# Patient Record
Sex: FEMALE | Race: WHITE | NOT HISPANIC OR LATINO | URBAN - METROPOLITAN AREA
[De-identification: names, ages, dates, MRNs, and addresses within clinical notes are randomized per-mention and may not be internally consistent; named-entity substitution may affect disease eponyms.]

---

## 2024-03-14 ENCOUNTER — OUTPATIENT (OUTPATIENT)
Dept: OUTPATIENT SERVICES | Facility: HOSPITAL | Age: 74
LOS: 1 days | End: 2024-03-14
Payer: MEDICARE

## 2024-03-14 VITALS
OXYGEN SATURATION: 96 % | SYSTOLIC BLOOD PRESSURE: 147 MMHG | TEMPERATURE: 99 F | WEIGHT: 190.92 LBS | DIASTOLIC BLOOD PRESSURE: 87 MMHG | HEIGHT: 65.75 IN | HEART RATE: 82 BPM | RESPIRATION RATE: 18 BRPM

## 2024-03-14 DIAGNOSIS — I10 ESSENTIAL (PRIMARY) HYPERTENSION: ICD-10-CM

## 2024-03-14 DIAGNOSIS — Z98.891 HISTORY OF UTERINE SCAR FROM PREVIOUS SURGERY: Chronic | ICD-10-CM

## 2024-03-14 DIAGNOSIS — Z01.818 ENCOUNTER FOR OTHER PREPROCEDURAL EXAMINATION: ICD-10-CM

## 2024-03-14 DIAGNOSIS — Z90.49 ACQUIRED ABSENCE OF OTHER SPECIFIED PARTS OF DIGESTIVE TRACT: Chronic | ICD-10-CM

## 2024-03-14 DIAGNOSIS — N95.0 POSTMENOPAUSAL BLEEDING: ICD-10-CM

## 2024-03-14 DIAGNOSIS — Z98.890 OTHER SPECIFIED POSTPROCEDURAL STATES: Chronic | ICD-10-CM

## 2024-03-14 LAB
ANION GAP SERPL CALC-SCNC: 12 MMOL/L — SIGNIFICANT CHANGE UP (ref 5–17)
BLD GP AB SCN SERPL QL: NEGATIVE — SIGNIFICANT CHANGE UP
BUN SERPL-MCNC: 13 MG/DL — SIGNIFICANT CHANGE UP (ref 7–23)
CALCIUM SERPL-MCNC: 9.4 MG/DL — SIGNIFICANT CHANGE UP (ref 8.4–10.5)
CHLORIDE SERPL-SCNC: 101 MMOL/L — SIGNIFICANT CHANGE UP (ref 96–108)
CO2 SERPL-SCNC: 26 MMOL/L — SIGNIFICANT CHANGE UP (ref 22–31)
CREAT SERPL-MCNC: 0.63 MG/DL — SIGNIFICANT CHANGE UP (ref 0.5–1.3)
EGFR: 95 ML/MIN/1.73M2 — SIGNIFICANT CHANGE UP
GLUCOSE SERPL-MCNC: 99 MG/DL — SIGNIFICANT CHANGE UP (ref 70–99)
HCT VFR BLD CALC: 43.9 % — SIGNIFICANT CHANGE UP (ref 34.5–45)
HGB BLD-MCNC: 14.1 G/DL — SIGNIFICANT CHANGE UP (ref 11.5–15.5)
MCHC RBC-ENTMCNC: 27.8 PG — SIGNIFICANT CHANGE UP (ref 27–34)
MCHC RBC-ENTMCNC: 32.1 GM/DL — SIGNIFICANT CHANGE UP (ref 32–36)
MCV RBC AUTO: 86.6 FL — SIGNIFICANT CHANGE UP (ref 80–100)
NRBC # BLD: 0 /100 WBCS — SIGNIFICANT CHANGE UP (ref 0–0)
PLATELET # BLD AUTO: 153 K/UL — SIGNIFICANT CHANGE UP (ref 150–400)
POTASSIUM SERPL-MCNC: 4.2 MMOL/L — SIGNIFICANT CHANGE UP (ref 3.5–5.3)
POTASSIUM SERPL-SCNC: 4.2 MMOL/L — SIGNIFICANT CHANGE UP (ref 3.5–5.3)
RBC # BLD: 5.07 M/UL — SIGNIFICANT CHANGE UP (ref 3.8–5.2)
RBC # FLD: 13.1 % — SIGNIFICANT CHANGE UP (ref 10.3–14.5)
RH IG SCN BLD-IMP: POSITIVE — SIGNIFICANT CHANGE UP
SODIUM SERPL-SCNC: 139 MMOL/L — SIGNIFICANT CHANGE UP (ref 135–145)
WBC # BLD: 8.57 K/UL — SIGNIFICANT CHANGE UP (ref 3.8–10.5)
WBC # FLD AUTO: 8.57 K/UL — SIGNIFICANT CHANGE UP (ref 3.8–10.5)

## 2024-03-14 PROCEDURE — G0463: CPT

## 2024-03-14 PROCEDURE — 36415 COLL VENOUS BLD VENIPUNCTURE: CPT

## 2024-03-14 PROCEDURE — 86900 BLOOD TYPING SEROLOGIC ABO: CPT

## 2024-03-14 PROCEDURE — 80048 BASIC METABOLIC PNL TOTAL CA: CPT

## 2024-03-14 PROCEDURE — 85027 COMPLETE CBC AUTOMATED: CPT

## 2024-03-14 PROCEDURE — 86901 BLOOD TYPING SEROLOGIC RH(D): CPT

## 2024-03-14 PROCEDURE — 86850 RBC ANTIBODY SCREEN: CPT

## 2024-03-14 RX ORDER — SODIUM CHLORIDE 9 MG/ML
1000 INJECTION, SOLUTION INTRAVENOUS
Refills: 0 | Status: DISCONTINUED | OUTPATIENT
Start: 2024-03-22 | End: 2024-04-05

## 2024-03-14 RX ORDER — LIDOCAINE HCL 20 MG/ML
0.2 VIAL (ML) INJECTION ONCE
Refills: 0 | Status: DISCONTINUED | OUTPATIENT
Start: 2024-03-22 | End: 2024-04-05

## 2024-03-14 NOTE — H&P PST ADULT - HISTORY OF PRESENT ILLNESS
73 year old female presents to PST prior to D&C hysteroscopy on 3/22/24 with Dr. Mandel. Patient endorses vaginal bleeding    PMHx   LMP  Patient denies chest pain, sob, ha, n/v/d, abdominal pain, f/c, urinary symptoms, hematuria  73 year old female presents to [facility] prior to D&C hysteroscopy on 3/22/24 with Dr. Mandel. Patient endorses vaginal spotting (scant blood/mucus) which started within the last year, intermittent spotting that has increased in frequency.   PMHx: HTN, heart murmur over 10 years ago, echo performed negative per patient with no need to follow up per patient. History of DVT in the right knee (22 years old) coumadin x1 month.   LMP: 25 years ago   Patient denies chest pain, sob, ha, n/v/d, abdominal pain, f/c.  73 year old female presents to [facility] prior to D&C hysteroscopy on 3/22/24 with Dr. Mandel. Patient endorses vaginal spotting (scant blood/mucus) which started within the last year, intermittent spotting that has increased in frequency.   PMHx: HTN, heart murmur over 10 years ago, echo performed negative per patient with no need to follow up per patient. History of DVT in the right knee (22 years old) coumadin x1 month (no other history of blood clots, no need to follow up per patient)  LMP: 25 years ago   Patient denies chest pain, sob, ha, n/v/d, abdominal pain, f/c.

## 2024-03-14 NOTE — H&P PST ADULT - NSICDXFAMILYHX_GEN_ALL_CORE_FT
FAMILY HISTORY:  Father  Still living? Unknown  FH: pancreatic cancer, Age at diagnosis: Age Unknown    Mother  Still living? Unknown  FH: breast cancer, Age at diagnosis: Age Unknown  FH: colon cancer, Age at diagnosis: Age Unknown

## 2024-03-14 NOTE — H&P PST ADULT - ASSESSMENT
denies loose and broken teeth, no dentures present  denies loose and broken teeth, no dentures present     · Functional Status	4-10 METS  6.7 daily walking 10,000 steps, caring for grandchild

## 2024-03-14 NOTE — H&P PST ADULT - NSICDXPASTMEDICALHX_GEN_ALL_CORE_FT
PAST MEDICAL HISTORY:  DVT, lower extremity     HTN (hypertension)      PAST MEDICAL HISTORY:  DVT, lower extremity     History of cardiac murmur     HTN (hypertension)

## 2024-03-14 NOTE — H&P PST ADULT - PROBLEM SELECTOR PLAN 1
D&C Hysteroscopy   CBC BMP and T&S in PST   Instructions and diet reviewed with patient and provided written copy.

## 2024-03-14 NOTE — H&P PST ADULT - ATTENDING COMMENTS
78yo F w pmb for hysteroscopy, unable to do in office, plan to perform under sedation today  consents in chart

## 2024-03-21 ENCOUNTER — TRANSCRIPTION ENCOUNTER (OUTPATIENT)
Age: 74
End: 2024-03-21

## 2024-03-22 ENCOUNTER — OUTPATIENT (OUTPATIENT)
Dept: OUTPATIENT SERVICES | Facility: HOSPITAL | Age: 74
LOS: 1 days | End: 2024-03-22
Payer: MEDICARE

## 2024-03-22 ENCOUNTER — TRANSCRIPTION ENCOUNTER (OUTPATIENT)
Age: 74
End: 2024-03-22

## 2024-03-22 VITALS
HEART RATE: 66 BPM | WEIGHT: 190.92 LBS | OXYGEN SATURATION: 97 % | HEIGHT: 65 IN | TEMPERATURE: 98 F | SYSTOLIC BLOOD PRESSURE: 136 MMHG | RESPIRATION RATE: 16 BRPM | DIASTOLIC BLOOD PRESSURE: 77 MMHG

## 2024-03-22 VITALS
RESPIRATION RATE: 20 BRPM | SYSTOLIC BLOOD PRESSURE: 132 MMHG | OXYGEN SATURATION: 98 % | DIASTOLIC BLOOD PRESSURE: 74 MMHG | HEART RATE: 58 BPM | TEMPERATURE: 97 F

## 2024-03-22 DIAGNOSIS — Z98.890 OTHER SPECIFIED POSTPROCEDURAL STATES: Chronic | ICD-10-CM

## 2024-03-22 DIAGNOSIS — Z98.891 HISTORY OF UTERINE SCAR FROM PREVIOUS SURGERY: Chronic | ICD-10-CM

## 2024-03-22 DIAGNOSIS — Z90.49 ACQUIRED ABSENCE OF OTHER SPECIFIED PARTS OF DIGESTIVE TRACT: Chronic | ICD-10-CM

## 2024-03-22 DIAGNOSIS — N95.0 POSTMENOPAUSAL BLEEDING: ICD-10-CM

## 2024-03-22 PROCEDURE — 58558 HYSTEROSCOPY BIOPSY: CPT

## 2024-03-22 PROCEDURE — 88341 IMHCHEM/IMCYTCHM EA ADD ANTB: CPT | Mod: 26

## 2024-03-22 PROCEDURE — C1782: CPT

## 2024-03-22 PROCEDURE — 88342 IMHCHEM/IMCYTCHM 1ST ANTB: CPT | Mod: 26

## 2024-03-22 PROCEDURE — 88342 IMHCHEM/IMCYTCHM 1ST ANTB: CPT | Mod: XU

## 2024-03-22 PROCEDURE — 88305 TISSUE EXAM BY PATHOLOGIST: CPT

## 2024-03-22 PROCEDURE — 88305 TISSUE EXAM BY PATHOLOGIST: CPT | Mod: 26

## 2024-03-22 PROCEDURE — 88360 TUMOR IMMUNOHISTOCHEM/MANUAL: CPT

## 2024-03-22 PROCEDURE — 88341 IMHCHEM/IMCYTCHM EA ADD ANTB: CPT

## 2024-03-22 DEVICE — AVETA FLEX RESECTING DEVICE 2.9MM: Type: IMPLANTABLE DEVICE | Status: FUNCTIONAL

## 2024-03-22 RX ORDER — HYDROMORPHONE HYDROCHLORIDE 2 MG/ML
0.5 INJECTION INTRAMUSCULAR; INTRAVENOUS; SUBCUTANEOUS
Refills: 0 | Status: DISCONTINUED | OUTPATIENT
Start: 2024-03-22 | End: 2024-03-22

## 2024-03-22 RX ORDER — ONDANSETRON 8 MG/1
4 TABLET, FILM COATED ORAL ONCE
Refills: 0 | Status: DISCONTINUED | OUTPATIENT
Start: 2024-03-22 | End: 2024-04-05

## 2024-03-22 NOTE — ASU DISCHARGE PLAN (ADULT/PEDIATRIC) - CARE PROVIDER_API CALL
Lionel Mandel  Obstetrics and Gynecology  [address] [facility], [address]  [place] [address]  Phone: [phone]  Fax: [phone]  Follow Up Time:

## 2024-03-22 NOTE — ASU PATIENT PROFILE, ADULT - NSICDXPASTMEDICALHX_GEN_ALL_CORE_FT
PAST MEDICAL HISTORY:  DVT, lower extremity     History of cardiac murmur     HTN (hypertension)

## 2024-03-22 NOTE — ASU DISCHARGE PLAN (ADULT/PEDIATRIC) - NURSING INSTRUCTIONS
If needed you can take Tylenol 650mg-1000mg again at 6pm for pain. Please do not exceed more then 4000mg in 24 hours. You can also alternate with Ibuprofen 600mg and take if every 6 hours, 3 hours apart from Tylenol with food. The next time you can take Ibuprofen is 6pm.

## 2024-03-22 NOTE — PRE-ANESTHESIA EVALUATION ADULT - NSANTHPMHFT_GEN_ALL_CORE
73F with PMH s/f HTN, hx provoked RLE DVT (in s/o OCP, no longer on AC) who has been experiencing vaginal spotting and now presents for hysteroscopy D&C    #heart murmur over 10 years ago, echo performed negative per patient. denies syncope, cp, sob with exertion

## 2024-03-22 NOTE — ASU DISCHARGE PLAN (ADULT/PEDIATRIC) - NS MD DC FALL RISK RISK
For information on Fall & Injury Prevention, visit: [email or web address] OR  [email or web address] OR  https://www.cdc.gov/steadi/patient.html

## 2024-03-22 NOTE — ASU PREOP CHECKLIST - NS PREOP CHK HIBICLENS NA
SKILLED SERVICES PROVIDED / MEDICAL   ASSESSMENT OF SOCIAL AND EMOTIONAL FACTORS  COUNSELING FOR LONG RANGE PLANNING AND DECISION MAKING  COMMUNITY RESOURCE PLANNING    HOMEBOUND STATUS- taxing effort to leave home, cont 02    HOME/SOCIAL ENVIRONMENT-Patient lives alone in a large home that she is having a hard time maintaining. Her niece was present and she assist patient with needs    CASE COMMUNICATION with Mary Flores. KARY N/A

## 2024-03-27 LAB — SURGICAL PATHOLOGY STUDY: SIGNIFICANT CHANGE UP

## 2024-04-05 PROBLEM — I82.409 ACUTE EMBOLISM AND THROMBOSIS OF UNSPECIFIED DEEP VEINS OF UNSPECIFIED LOWER EXTREMITY: Chronic | Status: ACTIVE | Noted: 2024-03-14

## 2024-04-08 ENCOUNTER — NON-APPOINTMENT (OUTPATIENT)
Age: 74
End: 2024-04-08

## 2024-04-10 ENCOUNTER — APPOINTMENT (OUTPATIENT)
Dept: CT IMAGING | Facility: IMAGING CENTER | Age: 74
End: 2024-04-10
Payer: MEDICARE

## 2024-04-10 ENCOUNTER — OUTPATIENT (OUTPATIENT)
Dept: OUTPATIENT SERVICES | Facility: HOSPITAL | Age: 74
LOS: 1 days | End: 2024-04-10
Payer: MEDICARE

## 2024-04-10 DIAGNOSIS — Z98.891 HISTORY OF UTERINE SCAR FROM PREVIOUS SURGERY: Chronic | ICD-10-CM

## 2024-04-10 DIAGNOSIS — C55 MALIGNANT NEOPLASM OF UTERUS, PART UNSPECIFIED: ICD-10-CM

## 2024-04-10 DIAGNOSIS — Z98.890 OTHER SPECIFIED POSTPROCEDURAL STATES: Chronic | ICD-10-CM

## 2024-04-10 DIAGNOSIS — Z90.49 ACQUIRED ABSENCE OF OTHER SPECIFIED PARTS OF DIGESTIVE TRACT: Chronic | ICD-10-CM

## 2024-04-10 PROCEDURE — 74177 CT ABD & PELVIS W/CONTRAST: CPT | Mod: 26,MH

## 2024-04-10 PROCEDURE — 74177 CT ABD & PELVIS W/CONTRAST: CPT | Mod: MH

## 2024-04-11 PROBLEM — I10 ESSENTIAL (PRIMARY) HYPERTENSION: Chronic | Status: ACTIVE | Noted: 2024-03-14

## 2024-04-11 PROBLEM — Z86.79 PERSONAL HISTORY OF OTHER DISEASES OF THE CIRCULATORY SYSTEM: Chronic | Status: ACTIVE | Noted: 2024-03-14

## 2024-04-12 ENCOUNTER — APPOINTMENT (OUTPATIENT)
Dept: GYNECOLOGIC ONCOLOGY | Facility: CLINIC | Age: 74
End: 2024-04-12
Payer: MEDICARE

## 2024-04-12 VITALS
BODY MASS INDEX: 31.65 KG/M2 | DIASTOLIC BLOOD PRESSURE: 80 MMHG | WEIGHT: 190 LBS | SYSTOLIC BLOOD PRESSURE: 140 MMHG | HEIGHT: 65 IN | HEART RATE: 67 BPM

## 2024-04-12 DIAGNOSIS — Z78.9 OTHER SPECIFIED HEALTH STATUS: ICD-10-CM

## 2024-04-12 PROCEDURE — 99205 OFFICE O/P NEW HI 60 MIN: CPT

## 2024-04-12 PROCEDURE — 99459 PELVIC EXAMINATION: CPT

## 2024-04-12 RX ORDER — LISINOPRIL 30 MG/1
TABLET ORAL
Refills: 0 | Status: ACTIVE | COMMUNITY

## 2024-04-12 RX ORDER — HYDROCHLOROTHIAZIDE 12.5 MG/1
TABLET ORAL
Refills: 0 | Status: ACTIVE | COMMUNITY

## 2024-04-12 NOTE — PHYSICAL EXAM
[Chaperone Present] : A chaperone was present in the examining room during all aspects of the physical examination [55922] : A chaperone was present during the pelvic exam. [FreeTextEntry2] : Duyen [Normal] : Recto-Vaginal Exam: Normal [de-identified] : vertical abdominal incision  [de-identified] : small uterus, vaginal length shortened, stenotic upper vaginal canal [Fully active, able to carry on all pre-disease performance without restriction] : Status 0 - Fully active, able to carry on all pre-disease performance without restriction

## 2024-04-12 NOTE — DISCUSSION/SUMMARY
[Reviewed Clinical Lab Test(s)] : Results of clinical tests were reviewed. [Reviewed Radiology Report(s)] : Radiology reports were reviewed. [Reviewed Radiology Film/Image(s)] : Images from radiology studies were reviewed and examined. [Discuss Tests w/Referring Providers] : Results of labs/radiology studies and the treatment recommendations were discussed with performing/referring physician. [Discuss Alternatives/Risks/Benefits w/Patient] : All alternatives, risks, and benefits were discussed with the patient/family and all questions were answered.  Patient expressed good understanding and appreciates the importance of follow up as recommended. [Visit Time ___ Minutes] : [unfilled] minutes [FreeTextEntry1] : 74 yo with G2 endometrial adenocarcinoma   The natural history of, risks, prognosis and standard of care treatment options for endometrial cancer were reviewed at length. The association of endometrial cancer with unopposed estrogen exposure, obesity or a family history of endometrial cancer were discussed. The following treatment options were reviewed with the patient:    Hysterectomy (open approach)  Hysterectomy (minimally invasive approach)  Primary radiation therapy  No further treatment   We discussed that a total hysterectomy, bilateral salpingo-oophorectomy and lymph node assessment is the standard of care for patients that are of appropriate surgical risk. Risk, benefits and alternatives to hysterectomy were reviewed including bleeding, infection, damage to nerves, blood vessels, bowel, bladder, ureters, urinary incontinence and lymphedema. We discussed the limited role of primary radiation therapy for patients that are of adequate surgical risk; however, the need for possible adjuvant therapies after surgery (including radiation) was discussed briefly.   We have discussed the risk of bleeding and the possibility of blood transfusions, which the patient is amenable to in the event that it is needed.   We have discussed the possibility of infection, including superficial infection of the wound, the underlying tissues or deep infection in the abdomen or pelvis, as well as urinary tract infection and pneumonia.  We discussed the steps taken to mitigate that risk; but the possibility of need for antibiotics, a prolonged hospital stay, hospital readmission, or ICU stay remain.  We discussed the individual risk of infection is different for each patient and each procedure.   We have discussed risk of damage to surrounding structures, including the large and small bowel, bladder and ureters, blood vessels, and nerves. We discussed that we attempt to identify and fix any injury at the time of surgery, but that this may involve a more extensive operation that originally planned, the possible involvement of additional surgical subspecialists and a longer hospital stay than is typical.   There is an increased risk of VTE and PE related to surgery and hospitalization especially in the setting of a cancer diagnosis. We discussed administration of heparin pre-operatively as well as during hospitalization if necessary to prevent formation of blood clots.   We discussed that there may be medical students, residents and fellows present during her procedure and participating in her post-operative care under my direct supervision. We discussed the importance of individuals participating in the procedure, including myself and assistants, to perform exam under anesthesia.   We have discussed the post operative expectations for the surgery as planned, but deviations from the surgical plan may be required in order to achieve the surgical goals. This may necessitate additional post operative care, in-hospital or at home as determined by the situation.    Based on exam and imaging findings, a minimally invasive surgical approach is recommended. The benefits of minimally invasive surgery were extensively reviewed, including fewer perioperative complications, shorter hospital stays and faster recovery time when compared to surgery via a laparotomy incision. Minimally invasive surgery in this setting is generally performed with a robotic approach. Randomized controlled trials demonstrate that endometrial cancer outcomes and survival are similar when women are treated with a minimally invasive or an open abdominal approach. The patient is aware that conversion to an open abdominal procedure may be required.   We discussed the utility of sentinel node mapping with ICG in endometrial cancer and that it is effective with a low false negative rate in all histological subtypes. The patient DOES NOT have an iodine allergy or known liver dysfunction. A low risk of allergic reaction to the ICG has been reported. We also discussed that in the case of failed mapping, a bilateral or unilateral lymphadenectomy will be performed at the surgeon's discretion depending on results of frozen pathology that may be utilized in the event of failed mapping.   Potential benefits of sentinel nodes including a higher detection rate for metastasis due to ultra-staging and potential reduction in operative morbidity. However, there remains uncertainty as to the role for treatment of micro-metastatic disease. Further, the benefit of operative morbidity associated with the SLN technique in endometrial cancer is not completely known. In other patient populations (e.g. the cervical cancer population) there has been observed reductions in morbidity with SLN biopsy compared to pelvic lymphadenectomy. Lymphedema, nerve dysfunction and lymphocysts are all potential risks with the SLN technique, however this risk is likely mitigated as compared to complete lymphadenectomy.   I gave the patient the opportunity to ask questions and answered all of them to her satisfaction. Support was also offered, and we will schedule the patient for a preoperative assessment and surgery. The patient is aware that final treatment and surveillance recommendations will be made after her case is reviewed by pathology and at our multidisciplinary tumor board conference following surgery.  Med clearance, elevated BP in office today PST OR scheduling   Ariella Reza MD, FACOG  Gynecologic Oncology

## 2024-04-12 NOTE — HISTORY OF PRESENT ILLNESS
[FreeTextEntry1] : Referred by Dr Mandel PCP Jignesh Pagan [phone]  74 yo with PMB found to have FIGO G2 EMCA on D+C.  States she has been having bleeding for 6-7 months. Mostly spotting when she wipes. Has been intermittent. Saw gyn who recommended sampling. She underwent D+C in OR.  States she went through menopause in her 50s. No bleeding until this event.    The patient denies weight change, headache, fatigue, CP, SOB, abdominal pain, distension, constipation, diarrhea, urinary symptoms.  She reports history of VTE @ 23 provoked by OCPs. Not on AC now.   HCM: Pap: 12/2023 NILM HPV - Mammo: unknown C-scope: 2023 wnl  PMH: HTN PSH: Appendectomy 1970s, CS 1985  Gyn Hx:  51yo  Ob Hx:  CSx1 Meds: Lisinopril 10, HCTZ 25, MVI Allergies: PCN- rash in childhood FH:Mother colon and breast 70s-80s, grandfather stomach 60s SH: Former smoker, quit 30 years ago, ETOH 6 drinks/wk , lives with , works as   TAUS: Ut 7.1 x 4 x 4 cm  CTAP performed 4/10 read pending

## 2024-04-18 ENCOUNTER — OUTPATIENT (OUTPATIENT)
Dept: OUTPATIENT SERVICES | Facility: HOSPITAL | Age: 74
LOS: 1 days | End: 2024-04-18

## 2024-04-18 VITALS
TEMPERATURE: 98 F | OXYGEN SATURATION: 97 % | DIASTOLIC BLOOD PRESSURE: 83 MMHG | WEIGHT: 199.08 LBS | HEART RATE: 65 BPM | SYSTOLIC BLOOD PRESSURE: 127 MMHG | HEIGHT: 64 IN | RESPIRATION RATE: 14 BRPM

## 2024-04-18 DIAGNOSIS — Z98.890 OTHER SPECIFIED POSTPROCEDURAL STATES: Chronic | ICD-10-CM

## 2024-04-18 DIAGNOSIS — Z98.891 HISTORY OF UTERINE SCAR FROM PREVIOUS SURGERY: Chronic | ICD-10-CM

## 2024-04-18 DIAGNOSIS — Z91.89 OTHER SPECIFIED PERSONAL RISK FACTORS, NOT ELSEWHERE CLASSIFIED: ICD-10-CM

## 2024-04-18 DIAGNOSIS — I10 ESSENTIAL (PRIMARY) HYPERTENSION: ICD-10-CM

## 2024-04-18 DIAGNOSIS — C54.1 MALIGNANT NEOPLASM OF ENDOMETRIUM: ICD-10-CM

## 2024-04-18 LAB
A1C WITH ESTIMATED AVERAGE GLUCOSE RESULT: 5.9 % — HIGH (ref 4–5.6)
ALBUMIN SERPL ELPH-MCNC: 4.3 G/DL — SIGNIFICANT CHANGE UP (ref 3.3–5)
ALP SERPL-CCNC: 96 U/L — SIGNIFICANT CHANGE UP (ref 40–120)
ALT FLD-CCNC: 23 U/L — SIGNIFICANT CHANGE UP (ref 4–33)
APPEARANCE UR: CLEAR — SIGNIFICANT CHANGE UP
AST SERPL-CCNC: 19 U/L — SIGNIFICANT CHANGE UP (ref 4–32)
BACTERIA # UR AUTO: NEGATIVE /HPF — SIGNIFICANT CHANGE UP
BILIRUB DIRECT SERPL-MCNC: <0.2 MG/DL — SIGNIFICANT CHANGE UP (ref 0–0.3)
BILIRUB INDIRECT FLD-MCNC: >0 MG/DL — SIGNIFICANT CHANGE UP (ref 0–1)
BILIRUB SERPL-MCNC: 0.2 MG/DL — SIGNIFICANT CHANGE UP (ref 0.2–1.2)
BILIRUB UR-MCNC: NEGATIVE — SIGNIFICANT CHANGE UP
BLD GP AB SCN SERPL QL: NEGATIVE — SIGNIFICANT CHANGE UP
CAST: 0 /LPF — SIGNIFICANT CHANGE UP (ref 0–4)
COLOR SPEC: YELLOW — SIGNIFICANT CHANGE UP
DIFF PNL FLD: ABNORMAL
ESTIMATED AVERAGE GLUCOSE: 123 — SIGNIFICANT CHANGE UP
GLUCOSE UR QL: NEGATIVE MG/DL — SIGNIFICANT CHANGE UP
HCT VFR BLD CALC: 44 % — SIGNIFICANT CHANGE UP (ref 34.5–45)
HGB BLD-MCNC: 14.1 G/DL — SIGNIFICANT CHANGE UP (ref 11.5–15.5)
KETONES UR-MCNC: NEGATIVE MG/DL — SIGNIFICANT CHANGE UP
LEUKOCYTE ESTERASE UR-ACNC: ABNORMAL
MCHC RBC-ENTMCNC: 27.9 PG — SIGNIFICANT CHANGE UP (ref 27–34)
MCHC RBC-ENTMCNC: 32 GM/DL — SIGNIFICANT CHANGE UP (ref 32–36)
MCV RBC AUTO: 87.1 FL — SIGNIFICANT CHANGE UP (ref 80–100)
NITRITE UR-MCNC: NEGATIVE — SIGNIFICANT CHANGE UP
NRBC # BLD: 0 /100 WBCS — SIGNIFICANT CHANGE UP (ref 0–0)
NRBC # FLD: 0 K/UL — SIGNIFICANT CHANGE UP (ref 0–0)
PH UR: 6.5 — SIGNIFICANT CHANGE UP (ref 5–8)
PLATELET # BLD AUTO: 149 K/UL — LOW (ref 150–400)
PROT SERPL-MCNC: 7.2 G/DL — SIGNIFICANT CHANGE UP (ref 6–8.3)
PROT UR-MCNC: NEGATIVE MG/DL — SIGNIFICANT CHANGE UP
RBC # BLD: 5.05 M/UL — SIGNIFICANT CHANGE UP (ref 3.8–5.2)
RBC # FLD: 13.2 % — SIGNIFICANT CHANGE UP (ref 10.3–14.5)
RBC CASTS # UR COMP ASSIST: 13 /HPF — HIGH (ref 0–4)
RH IG SCN BLD-IMP: POSITIVE — SIGNIFICANT CHANGE UP
RH IG SCN BLD-IMP: POSITIVE — SIGNIFICANT CHANGE UP
SP GR SPEC: 1.01 — SIGNIFICANT CHANGE UP (ref 1–1.03)
SQUAMOUS # UR AUTO: 2 /HPF — SIGNIFICANT CHANGE UP (ref 0–5)
UROBILINOGEN FLD QL: 0.2 MG/DL — SIGNIFICANT CHANGE UP (ref 0.2–1)
WBC # BLD: 6.41 K/UL — SIGNIFICANT CHANGE UP (ref 3.8–10.5)
WBC # FLD AUTO: 6.41 K/UL — SIGNIFICANT CHANGE UP (ref 3.8–10.5)
WBC UR QL: 19 /HPF — HIGH (ref 0–5)

## 2024-04-18 RX ORDER — SODIUM CHLORIDE 9 MG/ML
1000 INJECTION, SOLUTION INTRAVENOUS
Refills: 0 | Status: DISCONTINUED | OUTPATIENT
Start: 2024-04-25 | End: 2024-05-09

## 2024-04-18 NOTE — H&P PST ADULT - HISTORY OF PRESENT ILLNESS
73 year old female presents to [facility], with pre op diagnosis of endometrial adenocarcinoma, for pre op evaluation prior to scheduled surgery- robotic assisted total laparoscopic hysterectomy, bilateral salpingo oophorectomy sentinel lymph node mapping and excoision cystoscopy with Dr Reza. Patient endorses vaginal  intermittent spotting that has increased in frequency since couple of months.

## 2024-04-18 NOTE — H&P PST ADULT - PROBLEM SELECTOR PLAN 1
Patient is tentatively scheduled scheduled surgery- robotic assisted total laparoscopic hysterectomy, bilateral salpingo oophorectomy sentinel lymph node mapping and excision cystoscopy with Dr Reza. - 04/25/24.    Pre-op instructions provided. Pt given verbal and written instructions with teach back on chlorhexidine wash and pepcid. Pt verbalized understanding with return demonstration.    CBC- due to intermittent vaginal spotting, Hepatic panel-, A1C,T&S, ABO, UA, Urine culture sent

## 2024-04-18 NOTE — H&P PST ADULT - GENITOURINARY COMMENTS
deferred/ pre op diagnosis- malignant neoplasm of endometrium patient reports she has intermittent vaginal spotting with increased frequency since couple of months, s/p hysteroscopy biopsy showed endometrial adenocarcinoma

## 2024-04-18 NOTE — H&P PST ADULT - ATTENDING COMMENTS
Risks, benefits, alternatives discussed at length in office  Consents signed  Proceed as planned    Ariella Reza MD, FACOG  Gynecologic Oncology

## 2024-04-18 NOTE — H&P PST ADULT - NSICDXPASTSURGICALHX_GEN_ALL_CORE_FT
PAST SURGICAL HISTORY:  H/O  section     H/O colonoscopy     History of hysteroscopy     S/P appendectomy

## 2024-04-18 NOTE — H&P PST ADULT - PROBLEM SELECTOR PLAN 2
Patient instructed to take lisinopril with a sip of water on the morning of procedure. Instruct patient to hold HCTZ on the DOS  Will obtain last EKG from PCP

## 2024-04-18 NOTE — H&P PST ADULT - NSICDXPASTMEDICALHX_GEN_ALL_CORE_FT
PAST MEDICAL HISTORY:  Anxiety     DVT, lower extremity     History of bronchitis     History of cardiac murmur     HTN (hypertension)

## 2024-04-19 ENCOUNTER — NON-APPOINTMENT (OUTPATIENT)
Age: 74
End: 2024-04-19

## 2024-04-19 LAB
CULTURE RESULTS: SIGNIFICANT CHANGE UP
SPECIMEN SOURCE: SIGNIFICANT CHANGE UP

## 2024-04-23 ENCOUNTER — NON-APPOINTMENT (OUTPATIENT)
Age: 74
End: 2024-04-23

## 2024-04-24 ENCOUNTER — TRANSCRIPTION ENCOUNTER (OUTPATIENT)
Age: 74
End: 2024-04-24

## 2024-04-25 ENCOUNTER — RESULT REVIEW (OUTPATIENT)
Age: 74
End: 2024-04-25

## 2024-04-25 ENCOUNTER — NON-APPOINTMENT (OUTPATIENT)
Age: 74
End: 2024-04-25

## 2024-04-25 ENCOUNTER — APPOINTMENT (OUTPATIENT)
Dept: GYNECOLOGIC ONCOLOGY | Facility: HOSPITAL | Age: 74
End: 2024-04-25

## 2024-04-25 ENCOUNTER — OUTPATIENT (OUTPATIENT)
Dept: OUTPATIENT SERVICES | Facility: HOSPITAL | Age: 74
LOS: 1 days | Discharge: HOME | End: 2024-04-25
Payer: MEDICARE

## 2024-04-25 ENCOUNTER — TRANSCRIPTION ENCOUNTER (OUTPATIENT)
Age: 74
End: 2024-04-25

## 2024-04-25 VITALS
RESPIRATION RATE: 16 BRPM | OXYGEN SATURATION: 96 % | WEIGHT: 199.08 LBS | DIASTOLIC BLOOD PRESSURE: 75 MMHG | SYSTOLIC BLOOD PRESSURE: 147 MMHG | HEIGHT: 64 IN | HEART RATE: 75 BPM | TEMPERATURE: 98 F

## 2024-04-25 VITALS
SYSTOLIC BLOOD PRESSURE: 143 MMHG | RESPIRATION RATE: 16 BRPM | DIASTOLIC BLOOD PRESSURE: 76 MMHG | HEART RATE: 77 BPM | OXYGEN SATURATION: 98 %

## 2024-04-25 DIAGNOSIS — Z98.891 HISTORY OF UTERINE SCAR FROM PREVIOUS SURGERY: Chronic | ICD-10-CM

## 2024-04-25 DIAGNOSIS — Z98.890 OTHER SPECIFIED POSTPROCEDURAL STATES: Chronic | ICD-10-CM

## 2024-04-25 DIAGNOSIS — Z90.49 ACQUIRED ABSENCE OF OTHER SPECIFIED PARTS OF DIGESTIVE TRACT: Chronic | ICD-10-CM

## 2024-04-25 DIAGNOSIS — C54.1 MALIGNANT NEOPLASM OF ENDOMETRIUM: ICD-10-CM

## 2024-04-25 LAB — GLUCOSE BLDC GLUCOMTR-MCNC: 100 MG/DL — HIGH (ref 70–99)

## 2024-04-25 PROCEDURE — 38571 LAPAROSCOPY LYMPHADENECTOMY: CPT | Mod: 52

## 2024-04-25 PROCEDURE — 88342 IMHCHEM/IMCYTCHM 1ST ANTB: CPT | Mod: 26

## 2024-04-25 PROCEDURE — 38571 LAPAROSCOPY LYMPHADENECTOMY: CPT | Mod: AS

## 2024-04-25 PROCEDURE — 38900 IO MAP OF SENT LYMPH NODE: CPT | Mod: 50

## 2024-04-25 PROCEDURE — 88112 CYTOPATH CELL ENHANCE TECH: CPT | Mod: 26

## 2024-04-25 PROCEDURE — 58571 TLH W/T/O 250 G OR LESS: CPT

## 2024-04-25 PROCEDURE — 88309 TISSUE EXAM BY PATHOLOGIST: CPT | Mod: 26

## 2024-04-25 PROCEDURE — 88307 TISSUE EXAM BY PATHOLOGIST: CPT | Mod: 26

## 2024-04-25 PROCEDURE — 58571 TLH W/T/O 250 G OR LESS: CPT | Mod: AS

## 2024-04-25 DEVICE — VISTASEAL FIBRIN HUMAN 10ML: Type: IMPLANTABLE DEVICE | Status: FUNCTIONAL

## 2024-04-25 RX ORDER — LISINOPRIL 2.5 MG/1
1 TABLET ORAL
Refills: 0 | DISCHARGE

## 2024-04-25 RX ORDER — OXYCODONE HYDROCHLORIDE 5 MG/1
1 TABLET ORAL
Qty: 10 | Refills: 0
Start: 2024-04-25

## 2024-04-25 RX ORDER — HYDROMORPHONE HYDROCHLORIDE 2 MG/ML
0.25 INJECTION INTRAMUSCULAR; INTRAVENOUS; SUBCUTANEOUS
Refills: 0 | Status: DISCONTINUED | OUTPATIENT
Start: 2024-04-25 | End: 2024-04-25

## 2024-04-25 RX ORDER — CHLORHEXIDINE GLUCONATE 213 G/1000ML
1 SOLUTION TOPICAL ONCE
Refills: 0 | Status: COMPLETED | OUTPATIENT
Start: 2024-04-25 | End: 2024-04-25

## 2024-04-25 RX ORDER — ACETAMINOPHEN 500 MG
3 TABLET ORAL
Qty: 0 | Refills: 0 | DISCHARGE

## 2024-04-25 RX ORDER — OXYCODONE HYDROCHLORIDE 5 MG/1
5 TABLET ORAL ONCE
Refills: 0 | Status: DISCONTINUED | OUTPATIENT
Start: 2024-04-25 | End: 2024-04-25

## 2024-04-25 RX ORDER — HYDROCHLOROTHIAZIDE 25 MG
1 TABLET ORAL
Refills: 0 | DISCHARGE

## 2024-04-25 RX ORDER — FENTANYL CITRATE 50 UG/ML
25 INJECTION INTRAVENOUS
Refills: 0 | Status: DISCONTINUED | OUTPATIENT
Start: 2024-04-25 | End: 2024-04-25

## 2024-04-25 RX ORDER — ONDANSETRON 8 MG/1
4 TABLET, FILM COATED ORAL ONCE
Refills: 0 | Status: DISCONTINUED | OUTPATIENT
Start: 2024-04-25 | End: 2024-05-09

## 2024-04-25 RX ORDER — BENZOCAINE AND MENTHOL 5; 1 G/100ML; G/100ML
1 LIQUID ORAL ONCE
Refills: 0 | Status: DISCONTINUED | OUTPATIENT
Start: 2024-04-25 | End: 2024-05-09

## 2024-04-25 RX ORDER — SODIUM CHLORIDE 9 MG/ML
1000 INJECTION, SOLUTION INTRAVENOUS
Refills: 0 | Status: DISCONTINUED | OUTPATIENT
Start: 2024-04-25 | End: 2024-05-09

## 2024-04-25 RX ORDER — HYDROMORPHONE HYDROCHLORIDE 2 MG/ML
0.5 INJECTION INTRAMUSCULAR; INTRAVENOUS; SUBCUTANEOUS
Refills: 0 | Status: DISCONTINUED | OUTPATIENT
Start: 2024-04-25 | End: 2024-04-25

## 2024-04-25 RX ORDER — IBUPROFEN 200 MG
3 TABLET ORAL
Qty: 0 | Refills: 0 | DISCHARGE

## 2024-04-25 RX ORDER — SIMETHICONE 80 MG/1
80 TABLET, CHEWABLE ORAL ONCE
Refills: 0 | Status: DISCONTINUED | OUTPATIENT
Start: 2024-04-25 | End: 2024-05-09

## 2024-04-25 RX ADMIN — HYDROMORPHONE HYDROCHLORIDE 0.5 MILLIGRAM(S): 2 INJECTION INTRAMUSCULAR; INTRAVENOUS; SUBCUTANEOUS at 13:00

## 2024-04-25 RX ADMIN — CHLORHEXIDINE GLUCONATE 1 APPLICATION(S): 213 SOLUTION TOPICAL at 06:18

## 2024-04-25 RX ADMIN — HYDROMORPHONE HYDROCHLORIDE 0.5 MILLIGRAM(S): 2 INJECTION INTRAMUSCULAR; INTRAVENOUS; SUBCUTANEOUS at 12:43

## 2024-04-25 NOTE — ASU DISCHARGE PLAN (ADULT/PEDIATRIC) - NURSING INSTRUCTIONS
Follow up with MD. Continue all medications as prescribed. You received IV Tylenol for pain management. Please DO NOT take any Tylenol (Acetaminophen) containing products, such as Vicodin, Percocet, Excedrin, and cold medications for the next 6 hours (until 5:40 PM). DO NOT TAKE MORE THAN 3000 MG OF TYLENOL in a 24 hour period. You received IV Toradol for pain management. Please DO NOT take Motrin/Ibuprofen/Advil/Aleve/NSAIDs (Non-Steroidal Anti-Inflammatory Drugs) for the next 6 hours (until 5:45 PM). When taking pain meds - take with food and know it may cause constipation and nausea - Do NOT drive while on narcotics.

## 2024-04-25 NOTE — ASU DISCHARGE PLAN (ADULT/PEDIATRIC) - FOLLOW UP APPOINTMENTS
[facility] may also call Recovery Room (PACU) 24/7 @ [phone]/[facility]

## 2024-04-25 NOTE — ASU DISCHARGE PLAN (ADULT/PEDIATRIC) - PROCEDURE
Robotic Total Laparoscopic Hysterectomy, Bilateral Salpingo-Oophorectomy, Sentinal Lymph Node Biopsy, Cystoscopy Robotic Total Laparoscopic Hysterectomy, Bilateral Salpingo-Oophorectomy, Lymph Node Dissection, Cystoscopy

## 2024-04-25 NOTE — ASU DISCHARGE PLAN (ADULT/PEDIATRIC) - CARE PROVIDER_API CALL
Ariella Reza  Gynecologic Oncology  [address] [place] [address]  Phone: [phone]  Fax: [phone]  Scheduled Appointment: 05/10/2024 10:00 AM

## 2024-04-25 NOTE — CHART NOTE - NSCHARTNOTEFT_GEN_A_CORE
R2 GYN POST-OP CHECK    S: Patient seen and evaluated at bedside. Pt sleeping, easily arousable. Patient reports pain well controlled with analgesia. Has not yet tried clear liquids. Not yet OOB. Denies N/V, SOB, CP, palpitations, fever/chills.      MEDICATIONS  (STANDING):  lactated ringers. 1000 milliLiter(s) (30 mL/Hr) IV Continuous <Continuous>  lactated ringers. 1000 milliLiter(s) (125 mL/Hr) IV Continuous <Continuous>    MEDICATIONS  (PRN):  benzocaine/menthol Lozenge 1 Lozenge Oral once PRN Sore Throat  HYDROmorphone  Injectable 0.5 milliGRAM(s) IV Push every 10 minutes PRN Severe Pain (7 - 10)  ondansetron Injectable 4 milliGRAM(s) IV Push once PRN Nausea and/or Vomiting  oxyCODONE    IR 5 milliGRAM(s) Oral once PRN Moderate Pain (4 - 6)  simethicone 80 milliGRAM(s) Chew once PRN Gas      O:   T(C): 36.4 (04-25-24 @ 12:25), Max: 36.4 (04-25-24 @ 12:25)  HR: 64 (04-25-24 @ 14:15) (63 - 79)  BP: 142/79 (04-25-24 @ 14:15) (110/65 - 142/79)  RR: 12 (04-25-24 @ 14:15) (12 - 22)  SpO2: 93% (04-25-24 @ 14:15) (82% - 100%)  Wt(kg): --    Gen: Resting comfortably, NAD  CV: Well-perfused  Lungs: Breathing comfortably on RA  Abd: Soft, appropriately tender, mildly distended, no rebound/guarding  Inc: 5 lsc port sites with op sites in place; clean/dry/intact  : Minimal bleeding        A/P: 73y with PMHx HTN and biopsy-proved endometrial cancer now POD#0 s/p RA-TLH, BSO, SLND, PLND, and Cystoscopy. Patient is stable and recovering appropriately.    Neuro: Tylenol, Motrin, Oxycodone PRN  CV: Hemodynamically stable  Pulm: Saturating well on room air. Encourage OOB.  GI: Advance to regular diet as tolerated  : DTV@8p  FEN: LR@125cc/hr  Heme: Early ambulation, initially with assistance then as tolerated  ID: Afebrile  Endo: No active issues   Dispo: D/c to home once voiding and PACU criteria met    D/w GYN ONC team,  ALONDRA Ponce PGY2

## 2024-04-25 NOTE — BRIEF OPERATIVE NOTE - SPECIMENS
1. pelvic washings  2. right external iliac sentinal LN  3. uterus, cervix, bilateral fallopian tubes and ovaries  4. right obturator sentinal LN   5. left pelvic lymph nodes

## 2024-04-25 NOTE — BRIEF OPERATIVE NOTE - COMMENTS
I, Sanna Marinelli PA-C, served as the first assistant in this operation. I assisted in placing ports, docking, and targeting the da Zulema robot, first assisted at the surgical field while the surgeon was performing the operation at the robotic console by providing instrument exchanges, tissue retraction, suction and irrigation, specimen retrieval, passing and removing sutures and sponges, undocking the robotic platform, and closed surgical wounds.

## 2024-04-25 NOTE — BRIEF OPERATIVE NOTE - NSICDXBRIEFPROCEDURE_GEN_ALL_CORE_FT
PROCEDURES:  Robot-assisted total hysterectomy with bilateral salpingo-oophorectomy (BSO) and bilateral pelvic lymphadenectomy 25-Apr-2024 12:28:15 L pelvic lymph node dissection, R sentinal lymph node dissection Dina Ponce  Cystoscopy 25-Apr-2024 12:28:38  Dina Ponce  Repair of right vaginal tear 25-Apr-2024 12:29:00  Dina Ponce

## 2024-04-25 NOTE — BRIEF OPERATIVE NOTE - OPERATION/FINDINGS
MARY Al RN     Exam under anesthesia revealed atrophic vagina with narrow introitus and small, anteverted uterus. Laparoscopy revealed atraumatic entry site and grossly normal bowel, omentum, liver edge, peritoneum, uterus, bilateral fallopian tubes and ovaries. No evidence of metastatic disease. Cystoscopy revealed grossly normal bladder with efflux from bilateral ureteral orifices. Excellent hemostasis at close of case. Exam under anesthesia revealed atrophic vagina with narrow introitus and small, anteverted uterus. Laparoscopy revealed atraumatic entry site and grossly normal bowel, omentum, liver edge, peritoneum, uterus, bilateral fallopian tubes and ovaries. Adhesions of bladder to lower uterine segment from previous surgery. R side sentinel node mapping to obturator and external iliac nodes. L side without mapping. L pelvic lymph node sampling performed. Frozen with superficial invasion. No evidence of metastatic disease or ascites. Cystoscopy revealed grossly normal bladder with efflux from bilateral ureteral orifices.

## 2024-04-25 NOTE — ASU DISCHARGE PLAN (ADULT/PEDIATRIC) - NS MD DC FALL RISK RISK
For information on Fall & Injury Prevention, visit: [email or web address] OR  [email or web address] OR  https://www.cdc.gov/steadi/patient.html

## 2024-04-25 NOTE — ASU PREOP CHECKLIST - ISOLATION PRECAUTIONS
Medication:   Requested Prescriptions     Pending Prescriptions Disp Refills    atorvastatin (LIPITOR) 10 MG tablet [Pharmacy Med Name: Atorvastatin Calcium Oral Tablet 10 MG] 90 tablet 0     Sig: TAKE 1 TABLET BY MOUTH ONE TIME A DAY       Last Filled:  10/10/19 #90, 2 RF     Patient Phone Number: [phone] (home)     Last appt: 4/17/20 anxiety, HTN - VV   Next appt: Visit date not found    Last Lipid:   Lab Results   Component Value Date    CHOL 146 08/13/2019    TRIG 72 08/13/2019    HDL 61 08/13/2019    LDLCALC 71 08/13/2019 none

## 2024-04-26 ENCOUNTER — NON-APPOINTMENT (OUTPATIENT)
Age: 74
End: 2024-04-26

## 2024-04-29 LAB — NON-GYNECOLOGICAL CYTOLOGY STUDY: SIGNIFICANT CHANGE UP

## 2024-05-03 ENCOUNTER — TRANSCRIPTION ENCOUNTER (OUTPATIENT)
Age: 74
End: 2024-05-03

## 2024-05-03 LAB — SURGICAL PATHOLOGY STUDY: SIGNIFICANT CHANGE UP

## 2024-05-10 ENCOUNTER — APPOINTMENT (OUTPATIENT)
Dept: GYNECOLOGIC ONCOLOGY | Facility: CLINIC | Age: 74
End: 2024-05-10
Payer: MEDICARE

## 2024-05-10 VITALS
HEIGHT: 65 IN | WEIGHT: 190 LBS | DIASTOLIC BLOOD PRESSURE: 83 MMHG | TEMPERATURE: 97.3 F | BODY MASS INDEX: 31.65 KG/M2 | SYSTOLIC BLOOD PRESSURE: 133 MMHG | HEART RATE: 79 BPM

## 2024-05-10 PROBLEM — Z87.09 PERSONAL HISTORY OF OTHER DISEASES OF THE RESPIRATORY SYSTEM: Chronic | Status: ACTIVE | Noted: 2024-04-18

## 2024-05-10 PROBLEM — F41.9 ANXIETY DISORDER, UNSPECIFIED: Chronic | Status: ACTIVE | Noted: 2024-04-18

## 2024-05-10 PROCEDURE — 99024 POSTOP FOLLOW-UP VISIT: CPT

## 2024-05-10 NOTE — ASSESSMENT
[FreeTextEntry1] : 72 yo s/p RA TLH BSO SLND   Path as above  Diagnosis: Stage IA (2023 Stage IA2) Grade 2  Discussed age and FIGO 2 as risk factors for recurrence.  Discussed VBT as recommendation to reduce local recurrence risk.  Discussed benefits and minimal associated risk.   She is unsure at this time if she would want to proceed with VBT at this point. I discussed that this would not start until at least 6 weeks post op to give vaginal cuff appropriate healing time. Offered rad onc consult, patient will think about it and let me know.   Will discuss again at next visit.   RTC 4 wks for cuff check   Ariella Reza MD, FACOG  Gynecologic Oncology   [TextEntry] : Surgical Pathology Report - Auth (Verified)  Specimen(s) Submitted 1-Right external iliac sentinel lymph node. 2-Uterus ,cervix ,bilateral fallopian tubes and ovaries. 3-Right obturator sentinel lymph node 4-Left pelvic lymph nodes  Final Diagnosis 1. Lymph node, right external iliac sentinel; excision:  - Two lymph nodes, negative for carcinoma (0/2).  2. Uterus, cervix, bilateral fallopian tubes and ovaries; hysterectomy and salpingo-oophorectomy: - Endometrial carcinoma, FIGO grade 2. - Myometrial invasion is identified (<50%). - No lymphovascular invasion seen. - Adenomyosis, involved by carcinoma. - Benign cervix, fallopian tubes and ovaries. - See synoptic summary.  3. Lymph node, right obturator sentinel; excision: - Two lymph nodes, negative for carcinoma (0/2).  4. Lymph nodes, left pelvic; excision: - Four lymph nodes, negative for carcinoma (0/4).

## 2024-05-10 NOTE — DISCUSSION/SUMMARY
[Clean] : was clean [Dry] : was dry [Intact] : was intact [None] : had no drainage [Doing Well] : is doing well [0] : 0 [Reviewed] : reviewed [Firm] : soft [Tender] : nontender [Rebound] : no rebound tenderness [Guarding] : no guarding [Diagnosis/Stage ___] : Given this data, a diagnosis of [unfilled] is rendered.

## 2024-05-15 ENCOUNTER — NON-APPOINTMENT (OUTPATIENT)
Age: 74
End: 2024-05-15

## 2024-05-23 ENCOUNTER — NON-APPOINTMENT (OUTPATIENT)
Age: 74
End: 2024-05-23

## 2024-05-24 ENCOUNTER — OUTPATIENT (OUTPATIENT)
Dept: OUTPATIENT SERVICES | Facility: HOSPITAL | Age: 74
LOS: 1 days | Discharge: HOME | End: 2024-05-24
Payer: MEDICARE

## 2024-05-24 DIAGNOSIS — Z98.890 OTHER SPECIFIED POSTPROCEDURAL STATES: Chronic | ICD-10-CM

## 2024-05-24 DIAGNOSIS — Z90.49 ACQUIRED ABSENCE OF OTHER SPECIFIED PARTS OF DIGESTIVE TRACT: Chronic | ICD-10-CM

## 2024-05-24 DIAGNOSIS — Z98.891 HISTORY OF UTERINE SCAR FROM PREVIOUS SURGERY: Chronic | ICD-10-CM

## 2024-06-07 ENCOUNTER — APPOINTMENT (OUTPATIENT)
Dept: RADIATION ONCOLOGY | Facility: CLINIC | Age: 74
End: 2024-06-07
Payer: MEDICARE

## 2024-06-07 VITALS
HEART RATE: 73 BPM | BODY MASS INDEX: 31.69 KG/M2 | RESPIRATION RATE: 18 BRPM | OXYGEN SATURATION: 94 % | SYSTOLIC BLOOD PRESSURE: 128 MMHG | HEIGHT: 65 IN | WEIGHT: 190.2 LBS | TEMPERATURE: 97.5 F | DIASTOLIC BLOOD PRESSURE: 82 MMHG

## 2024-06-07 PROCEDURE — 99204 OFFICE O/P NEW MOD 45 MIN: CPT

## 2024-06-07 NOTE — PHYSICAL EXAM
[General Appearance - In No Acute Distress] : in no acute distress [] : no respiratory distress [Oriented To Time, Place, And Person] : oriented to person, place, and time

## 2024-06-07 NOTE — OB/GYN HISTORY
[Definite:  ___ (Date)] : the last menstrual period was [unfilled] [History of Birth Control Pills] : Patient has a history of taking birth control pills

## 2024-06-12 NOTE — VITALS
[Maximal Pain Intensity: 0/10] : 0/10 [Least Pain Intensity: 0/10] : 0/10 [90: Able to carry normal activity; minor signs or symptoms of disease.] : 90: Able to carry normal activity; minor signs or symptoms of disease.  [ECOG Performance Status: 1 - Restricted in physically strenuous activity but ambulatory and able to carry out work of a light or sedentary nature] : Performance Status: 1 - Restricted in physically strenuous activity but ambulatory and able to carry out work of a light or sedentary nature, e.g., light house work, office work [1 - Distress Level] : Distress Level: 1 [Date: ____________] : Patient's last distress assessment performed on [unfilled].

## 2024-06-12 NOTE — HISTORY OF PRESENT ILLNESS
[FreeTextEntry1] : NAFISA GARNETT is a 73 year old F with newly diagnosed grade 2 stage IA endometrial cancer s/p surgical staging here to discuss adjuvant vaginal brachytherapy  Her HPI is as follows Reports intermittent PMB for 6-7 months.   3/22/24 [facility]: endometrial adenocarcinoma, moderately differentiated, FIGO grade 2  4/25/24 [facility]: FIGO grade 2 endometrial carcinoma with MI 5mm/12mm. No LVI. No LN involvement.   Post op she is feeling well.  No urinary or bowel complaints.  Follow up with Dr. Reza is next week  PMH: HTN PSH: Appendectomy 1970s, CS 1985, hysterectomy Family Hx :Mother colon and breast 70s-80s, grandfather stomach 60s

## 2024-06-17 ENCOUNTER — APPOINTMENT (OUTPATIENT)
Dept: GYNECOLOGIC ONCOLOGY | Facility: CLINIC | Age: 74
End: 2024-06-17

## 2024-06-21 DIAGNOSIS — C54.1 MALIGNANT NEOPLASM OF ENDOMETRIUM: ICD-10-CM

## 2024-06-26 ENCOUNTER — APPOINTMENT (OUTPATIENT)
Dept: GYNECOLOGIC ONCOLOGY | Facility: CLINIC | Age: 74
End: 2024-06-26
Payer: MEDICARE

## 2024-06-26 VITALS
WEIGHT: 189 LBS | TEMPERATURE: 97.7 F | HEART RATE: 77 BPM | OXYGEN SATURATION: 96 % | HEIGHT: 65 IN | RESPIRATION RATE: 18 BRPM | DIASTOLIC BLOOD PRESSURE: 79 MMHG | SYSTOLIC BLOOD PRESSURE: 156 MMHG | BODY MASS INDEX: 31.49 KG/M2

## 2024-06-26 PROCEDURE — 99024 POSTOP FOLLOW-UP VISIT: CPT

## 2024-06-27 ENCOUNTER — APPOINTMENT (OUTPATIENT)
Dept: RADIATION ONCOLOGY | Facility: CLINIC | Age: 74
End: 2024-06-27

## 2024-06-27 VITALS
RESPIRATION RATE: 16 BRPM | HEART RATE: 61 BPM | TEMPERATURE: 97 F | HEIGHT: 65 IN | WEIGHT: 190.37 LBS | DIASTOLIC BLOOD PRESSURE: 68 MMHG | SYSTOLIC BLOOD PRESSURE: 120 MMHG | BODY MASS INDEX: 31.72 KG/M2 | OXYGEN SATURATION: 96 %

## 2024-06-27 DIAGNOSIS — C54.1 MALIGNANT NEOPLASM OF ENDOMETRIUM: ICD-10-CM

## 2024-06-27 PROCEDURE — 77290 THER RAD SIMULAJ FIELD CPLX: CPT | Mod: 26

## 2024-06-27 PROCEDURE — 77263 THER RADIOLOGY TX PLNG CPLX: CPT

## 2024-06-27 PROCEDURE — ZZZZZ: CPT

## 2024-07-01 PROBLEM — C54.1 ENDOMETRIAL ADENOCARCINOMA: Status: ACTIVE | Noted: 2024-04-12

## 2024-07-10 PROCEDURE — 77770 HDR RDNCL NTRSTL/ICAV BRCHTX: CPT | Mod: 26

## 2024-07-10 PROCEDURE — 77332 RADIATION TREATMENT AID(S): CPT | Mod: 26

## 2024-07-10 PROCEDURE — 57156 INS VAG BRACHYTX DEVICE: CPT

## 2024-07-12 PROCEDURE — 77332 RADIATION TREATMENT AID(S): CPT | Mod: 26

## 2024-07-12 PROCEDURE — 77770 HDR RDNCL NTRSTL/ICAV BRCHTX: CPT | Mod: 26

## 2024-07-12 PROCEDURE — 57156 INS VAG BRACHYTX DEVICE: CPT

## 2024-07-19 PROCEDURE — 77332 RADIATION TREATMENT AID(S): CPT | Mod: 26

## 2024-07-19 PROCEDURE — 77770 HDR RDNCL NTRSTL/ICAV BRCHTX: CPT | Mod: 26

## 2024-07-19 PROCEDURE — 57156 INS VAG BRACHYTX DEVICE: CPT

## 2024-07-23 ENCOUNTER — NON-APPOINTMENT (OUTPATIENT)
Age: 74
End: 2024-07-23

## 2024-08-29 ENCOUNTER — APPOINTMENT (OUTPATIENT)
Dept: RADIATION ONCOLOGY | Facility: CLINIC | Age: 74
End: 2024-08-29

## 2024-08-29 VITALS
HEART RATE: 66 BPM | OXYGEN SATURATION: 97 % | RESPIRATION RATE: 16 BRPM | SYSTOLIC BLOOD PRESSURE: 132 MMHG | HEIGHT: 65 IN | WEIGHT: 190 LBS | DIASTOLIC BLOOD PRESSURE: 80 MMHG | BODY MASS INDEX: 31.65 KG/M2

## 2024-08-29 DIAGNOSIS — C54.1 MALIGNANT NEOPLASM OF ENDOMETRIUM: ICD-10-CM

## 2024-08-29 PROCEDURE — 99024 POSTOP FOLLOW-UP VISIT: CPT

## 2024-08-29 NOTE — REVIEW OF SYSTEMS
[Constipation: Grade 0] : Constipation: Grade 0 [Diarrhea: Grade 0] : Diarrhea: Grade 0 [Nausea: Grade 0] : Nausea: Grade 0 [Fatigue: Grade 0] : Fatigue: Grade 0 [Hematuria: Grade 0] : Hematuria: Grade 0 [Urinary Incontinence: Grade 0] : Urinary Incontinence: Grade 0  [Urinary Retention: Grade 0] : Urinary Retention: Grade 0 [Urinary Tract Pain: Grade 0] : Urinary Tract Pain: Grade 0 [Urinary Urgency: Grade 0] : Urinary Urgency: Grade 0 [Urinary Frequency: Grade 0] : Urinary Frequency: Grade 0 [Negative] : Neurological

## 2024-09-04 NOTE — DISEASE MANAGEMENT
[Pathological] : TNM Stage: p [IA] : IA [FreeTextEntry4] : endometrial [TTNM] : 1a [NTNM] : 0 [MTNM] : x

## 2024-09-04 NOTE — PHYSICAL EXAM
[Normal] : well developed, well nourished, in no acute distress [] : no respiratory distress [Respiration, Rhythm And Depth] : normal respiratory rhythm and effort [Edema] : no peripheral edema present [Abdomen Soft] : soft [Abdomen Tenderness] : non-tender [Skin Color & Pigmentation] : normal skin color and pigmentation [No Focal Deficits] : no focal deficits [Oriented To Time, Place, And Person] : oriented to person, place, and time [Affect] : the affect was normal [Normal External Genitalia] : normal external genitalia  [Normal Vaginal Cuff] : vaginal cuff without lesion or nodularity [de-identified] : No mass or bleeding noted, minor post radiation changes.

## 2024-09-04 NOTE — PHYSICAL EXAM
[Normal] : well developed, well nourished, in no acute distress [] : no respiratory distress [Respiration, Rhythm And Depth] : normal respiratory rhythm and effort [Edema] : no peripheral edema present [Abdomen Soft] : soft [Abdomen Tenderness] : non-tender [Skin Color & Pigmentation] : normal skin color and pigmentation [No Focal Deficits] : no focal deficits [Oriented To Time, Place, And Person] : oriented to person, place, and time [Affect] : the affect was normal [Normal External Genitalia] : normal external genitalia  [Normal Vaginal Cuff] : vaginal cuff without lesion or nodularity [de-identified] : No mass or bleeding noted, minor post radiation changes.

## 2024-09-04 NOTE — DISEASE MANAGEMENT
[Pathological] : TNM Stage: p [IA] : IA [FreeTextEntry4] : endometrial [TTNM] : 1a [MTNM] : x [NTNM] : 0

## 2024-09-04 NOTE — HISTORY OF PRESENT ILLNESS
[FreeTextEntry1] : NAFISA GARNETT is a 74-year-old F with newly diagnosed grade 2 stage IA endometrial cancer s/p surgical staging and adjuvant vaginal brachytherapy.  Her HPI is as follows:  Reports intermittent PMB for 6-7 months.   3/22/24 [facility]: endometrial adenocarcinoma, moderately differentiated, FIGO grade 2  4/25/24 [facility]: FIGO grade 2 endometrial carcinoma with MI 5mm/12mm. No LVI. No LN involvement.   7/10/24 - 7/19/24: completed VBT to a total dose of 2100cGy in 3 fractions   8/29/24: She presents today for PTE. She is generally feeling well. She notes occasional lower abdominal discomfort, but no pain. No vaginal bleeding. No GI/ complaints. Vaginal dilators provided and pt education reviewed. She is not sexually active. Continues following with Dr. Reza, next visit in November.

## 2024-09-04 NOTE — PHYSICAL EXAM
[Normal] : well developed, well nourished, in no acute distress [] : no respiratory distress [Respiration, Rhythm And Depth] : normal respiratory rhythm and effort [Edema] : no peripheral edema present [Abdomen Soft] : soft [Abdomen Tenderness] : non-tender [Skin Color & Pigmentation] : normal skin color and pigmentation [No Focal Deficits] : no focal deficits [Oriented To Time, Place, And Person] : oriented to person, place, and time [Affect] : the affect was normal [Normal External Genitalia] : normal external genitalia  [Normal Vaginal Cuff] : vaginal cuff without lesion or nodularity [de-identified] : No mass or bleeding noted, minor post radiation changes.

## 2024-11-08 ENCOUNTER — APPOINTMENT (OUTPATIENT)
Dept: GYNECOLOGIC ONCOLOGY | Facility: CLINIC | Age: 74
End: 2024-11-08
Payer: MEDICARE

## 2024-11-08 VITALS
TEMPERATURE: 97.8 F | SYSTOLIC BLOOD PRESSURE: 132 MMHG | HEART RATE: 70 BPM | BODY MASS INDEX: 31.99 KG/M2 | DIASTOLIC BLOOD PRESSURE: 72 MMHG | HEIGHT: 65 IN | WEIGHT: 192 LBS | OXYGEN SATURATION: 97 %

## 2024-11-08 DIAGNOSIS — C54.1 MALIGNANT NEOPLASM OF ENDOMETRIUM: ICD-10-CM

## 2024-11-08 DIAGNOSIS — L91.8 OTHER HYPERTROPHIC DISORDERS OF THE SKIN: ICD-10-CM

## 2024-11-08 PROCEDURE — 11102 TANGNTL BX SKIN SINGLE LES: CPT

## 2024-11-08 PROCEDURE — 99459 PELVIC EXAMINATION: CPT

## 2024-11-08 PROCEDURE — 99213 OFFICE O/P EST LOW 20 MIN: CPT | Mod: 25

## 2024-11-12 LAB — CORE LAB BIOPSY: NORMAL

## 2024-12-24 PROBLEM — F10.90 ALCOHOL USE: Status: ACTIVE | Noted: 2024-04-12

## 2025-02-28 ENCOUNTER — APPOINTMENT (OUTPATIENT)
Dept: GYNECOLOGIC ONCOLOGY | Facility: CLINIC | Age: 75
End: 2025-02-28
Payer: MEDICARE

## 2025-02-28 VITALS
BODY MASS INDEX: 31.49 KG/M2 | HEART RATE: 61 BPM | WEIGHT: 189 LBS | SYSTOLIC BLOOD PRESSURE: 133 MMHG | OXYGEN SATURATION: 98 % | DIASTOLIC BLOOD PRESSURE: 83 MMHG | HEIGHT: 65 IN | TEMPERATURE: 97.6 F

## 2025-02-28 DIAGNOSIS — C54.1 MALIGNANT NEOPLASM OF ENDOMETRIUM: ICD-10-CM

## 2025-02-28 DIAGNOSIS — Z00.00 ENCOUNTER FOR GENERAL ADULT MEDICAL EXAMINATION W/OUT ABNORMAL FINDINGS: ICD-10-CM

## 2025-02-28 PROCEDURE — 99213 OFFICE O/P EST LOW 20 MIN: CPT

## 2025-02-28 PROCEDURE — 99459 PELVIC EXAMINATION: CPT

## 2025-06-27 ENCOUNTER — NON-APPOINTMENT (OUTPATIENT)
Age: 75
End: 2025-06-27

## 2025-06-27 ENCOUNTER — APPOINTMENT (OUTPATIENT)
Dept: GYNECOLOGIC ONCOLOGY | Facility: CLINIC | Age: 75
End: 2025-06-27
Payer: MEDICARE

## 2025-06-27 VITALS
BODY MASS INDEX: 31.16 KG/M2 | DIASTOLIC BLOOD PRESSURE: 83 MMHG | SYSTOLIC BLOOD PRESSURE: 138 MMHG | HEIGHT: 65 IN | RESPIRATION RATE: 16 BRPM | WEIGHT: 187 LBS | TEMPERATURE: 97.7 F | OXYGEN SATURATION: 96 % | HEART RATE: 70 BPM

## 2025-06-27 PROCEDURE — 99459 PELVIC EXAMINATION: CPT

## 2025-06-27 PROCEDURE — 99213 OFFICE O/P EST LOW 20 MIN: CPT

## 2025-09-04 ENCOUNTER — APPOINTMENT (OUTPATIENT)
Dept: RADIATION ONCOLOGY | Facility: CLINIC | Age: 75
End: 2025-09-04

## (undated) DEVICE — POSITIONER FOAM EGG CRATE ULNAR 2PCS (PINK)

## (undated) DEVICE — PRESSURE INFUSOR BAG 1000ML

## (undated) DEVICE — LUBRICANT INST ELECTROLUBE Z SOLUTION

## (undated) DEVICE — XI SEAL UNIVERSIAL 5-12MM

## (undated) DEVICE — DRSG COMBINE 5X9"

## (undated) DEVICE — DRAPE 1/2 SHEET 40X57"

## (undated) DEVICE — XI DRAPE COLUMN

## (undated) DEVICE — WARMING BLANKET FULL ADULT

## (undated) DEVICE — GLV 6.5 PROTEXIS (WHITE)

## (undated) DEVICE — PACK D&C

## (undated) DEVICE — AVETA CORAL HYSTEROSCOPE 4.6MM DISP

## (undated) DEVICE — LIGASURE BLUNT TIP 37CM

## (undated) DEVICE — TUBING FLUID ADMINISTRATION SET PRIM 70"

## (undated) DEVICE — XI ARM FORCEP FENESTRATED BIPOLAR 8MM

## (undated) DEVICE — VENODYNE/SCD SLEEVE CALF MEDIUM

## (undated) DEVICE — AVETA FLUID MANAGEMENT ACCESSORY W CAP

## (undated) DEVICE — D HELP - CLEARVIEW CLEARIFY SYSTEM

## (undated) DEVICE — NDL HYPO REGULAR BEVEL 25G X 1.5" (BLUE)

## (undated) DEVICE — UTERINE MANIPULATOR CONMED VCARE LG 37MM

## (undated) DEVICE — APPLICATOR VISTASEAL LAP DUAL 45CM FLEX

## (undated) DEVICE — DRSG STERISTRIPS 0.5 X 4"

## (undated) DEVICE — SUT MONOCRYL 4-0 27" PS-2 UNDYED

## (undated) DEVICE — XI ARM FORCEP PROGRASP 8MM

## (undated) DEVICE — UTERINE MANIPULATOR CONMED VCARE SM 32MM

## (undated) DEVICE — DRSG TEGADERM 2.5X3"

## (undated) DEVICE — DRSG TELFA 3 X 8

## (undated) DEVICE — INSUFFLATION NDL COVIDIEN SURGINEEDLE VERESS 120MM

## (undated) DEVICE — SYR LUER LOK 10CC

## (undated) DEVICE — PACK LITHOTOMY

## (undated) DEVICE — SOL IRR POUR NS 0.9% 500ML

## (undated) DEVICE — WARMING BLANKET UPPER ADULT

## (undated) DEVICE — UTERINE MANIPULATOR CONMED VCARE MED 34MM

## (undated) DEVICE — XI DRAPE ARM

## (undated) DEVICE — ENDOCATCH 10MM SPECIMEN POUCH

## (undated) DEVICE — AVETA FLUID MANAGEMENT ACCESSORY

## (undated) DEVICE — TROCAR APPLIED MEDICAL KII FIOS FIRST ENTRY 5MM X 100MM ADVANCED FIXATION

## (undated) DEVICE — TROCAR SURGIQUEST AIRSEAL 5MM X 100MM

## (undated) DEVICE — ELCTR BOVIE PENCIL SMOKE EVACUATION

## (undated) DEVICE — XI CORD BIPOLAR CAUTERY (BLUE)

## (undated) DEVICE — FOLEY TRAY 16FR 5CC LF UMETER CLOSED

## (undated) DEVICE — DRSG DERMABOND MINI

## (undated) DEVICE — XI ARM NEEDLE DRIVER SUTURECUT MEGA 8MM

## (undated) DEVICE — XI OBTURATOR OPTICAL BLADELESS 8MM

## (undated) DEVICE — TRAP SPECIMEN SPUTUM 40CC

## (undated) DEVICE — SUT VLOC 180 0 9" GS-21 GREEN

## (undated) DEVICE — INZII RETRIEVAL SYSTEM 5MM

## (undated) DEVICE — SOL IRR BAG NS 0.9% 1000ML

## (undated) DEVICE — UTERINE MANIPULATOR COOPER SURGICAL 5MM 33CM GREEN

## (undated) DEVICE — XI ARM SCISSOR MONO CURVED

## (undated) DEVICE — DRAPE LIGHT HANDLE COVER (GREEN)

## (undated) DEVICE — XI TIP COVER

## (undated) DEVICE — POSITIONER STRAP ARMBOARD VELCRO TS-30

## (undated) DEVICE — PACK PERI GYN

## (undated) DEVICE — TROCAR COVIDIEN VERSAONE FIXATION CANNULA 5MM

## (undated) DEVICE — TUBING STRYKEFLOW II SUCTION / IRRIGATOR

## (undated) DEVICE — SUT VICRYL 0 27" UR-6

## (undated) DEVICE — POSITIONER PINK PAD PIGAZZI SYSTEM W ARM PROTECTOR

## (undated) DEVICE — TUBING AIRSEAL TRI-LUMEN FILTERED

## (undated) DEVICE — TROCAR COVIDIEN BLUNT TIP HASSAN 10MM STANDARD

## (undated) DEVICE — XI CORD MONOPOLAR CAUTERY (GREEN)